# Patient Record
Sex: FEMALE | Race: WHITE | Employment: STUDENT | URBAN - METROPOLITAN AREA
[De-identification: names, ages, dates, MRNs, and addresses within clinical notes are randomized per-mention and may not be internally consistent; named-entity substitution may affect disease eponyms.]

---
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
## 2021-12-21 NOTE — PROGRESS NOTES
"  Assessment & Plan     Pain of right lower leg  They wanted ct or mri first however.  We discussed I can't order this as I would not know what to order and suspect it may not be covered by insurance. Prefer a specialist opinion. I can however get a knee xray to start there for her knee pain.  I suspect she will need physical therapy as well.           - Peds Orthopedics Referral; Future    Hip pain, right  Could be M/S or referred pain, same as above  - Peds Orthopedics Referral; Future    Chronic pain of right knee  Same as above referred to specialist  SHALA spencern  - Peds Orthopedics Referral; Future  - XR Knee Right 3 Views; Future    Also pain on left heel-? Corn, offerred podiatry but they declined for now. Did not have much time to talk about this today mentioned at end of visit.   Also mole left leg-they will monitor did not look concerning today.     Marely Burns PA-C  [facility]    Billin min spent on patient today including chart review, history, exam, and explaining treatment plan and follow-up.     Juan Jose Sosa is a 21 year old who presents for the following health issues  accompanied by her mother.    HPI     Mom is with today.     Per pt would like order for MRI or CT for her leg per [facility].     I have never seen patient for this before.  Mom did reading online and that is where this is coming from.  On her right side her foot has \"always turned inwards\". This affects her knee and hip they feel as she always has pain. Worse when on her feet for long periods or walking for long periods. Pain deep into the knee and right hip. No swelling. No fevers. No pain on left side.   Right thigh does not hurt there is no radiation from back into this. Complains of bilateral back pain.  knee pain for a few years ongoing but worse these past few months.  Used to go to chiropractor for back pain. Never did physical therapy. No imaging done. No treatment as a child " "was told she would outgrow it.     Is on feet a lot for her job. sometimes knee gives out per patient. No known injury.  Has not seen a physical therapy or ortho doctor for this  O Pain is daily.  Mom is wondering if her femur or other bones are \"twisted\".     Mom is wondering if it is a congenital issue with her bone. There is a doctor at [place] that deals with this.   Review of Systems   Constitutional, HEENT, cardiovascular, pulmonary, GI, , musculoskeletal, neuro, skin, endocrine and psych systems are negative, except as otherwise noted.      Objective    /68   Pulse 81   Temp 98.8  F (37.1  C) (Tympanic)   Resp 18   Wt 61.2 kg (135 lb)   SpO2 99%   BMI 23.17 kg/m    Body mass index is 23.17 kg/m .  Physical Exam   GENERAL: healthy, alert and no distress  NECK: no adenopathy, no asymmetry, masses, or scars and thyroid normal to palpation  RESP: lungs clear to auscultation - no rales, rhonchi or wheezes  CV: regular rate and rhythm, normal S1 S2, no S3 or S4, no murmur, click or rub, no peripheral edema and peripheral pulses strong  MS: no gross musculoskeletal defects noted, no edema  SKIN: no suspicious lesions or rashes  NEURO: Normal strength and tone, mentation intact and speech normal  PSYCH: mentation appears normal, affect normal/bright  Ortho: right Knee-no pain over patella, it does face more medially than left side. R foot is more inverted as well.  No erythema.  No effusion.  No ecchymosis. No warmth.  Tender to palpation --no location.   Range of motion intact fully.  Sensation intact distally.  Distal pulses strong. Hip, knee, and ankle strength 5/5 and equal bilaterally.  Anterior drawer sign negative. Valgus(MCL)/varus (LCL) testing negative for pain.  McMurrays negative.       Xray-pending          "

## 2022-01-06 ENCOUNTER — OFFICE VISIT (OUTPATIENT)
Dept: FAMILY MEDICINE | Facility: CLINIC | Age: 22
End: 2022-01-06
Payer: COMMERCIAL

## 2022-01-06 ENCOUNTER — ANCILLARY PROCEDURE (OUTPATIENT)
Dept: GENERAL RADIOLOGY | Facility: CLINIC | Age: 22
End: 2022-01-06
Attending: PHYSICIAN ASSISTANT
Payer: COMMERCIAL

## 2022-01-06 VITALS
OXYGEN SATURATION: 99 % | TEMPERATURE: 98.8 F | BODY MASS INDEX: 23.17 KG/M2 | SYSTOLIC BLOOD PRESSURE: 125 MMHG | HEART RATE: 81 BPM | RESPIRATION RATE: 18 BRPM | DIASTOLIC BLOOD PRESSURE: 68 MMHG | WEIGHT: 135 LBS

## 2022-01-06 DIAGNOSIS — M25.551 HIP PAIN, RIGHT: ICD-10-CM

## 2022-01-06 DIAGNOSIS — G89.29 CHRONIC PAIN OF RIGHT KNEE: ICD-10-CM

## 2022-01-06 DIAGNOSIS — M25.561 CHRONIC PAIN OF RIGHT KNEE: ICD-10-CM

## 2022-01-06 DIAGNOSIS — M79.661 PAIN OF RIGHT LOWER LEG: Primary | ICD-10-CM

## 2022-01-06 PROCEDURE — 99215 OFFICE O/P EST HI 40 MIN: CPT | Performed by: PHYSICIAN ASSISTANT

## 2022-01-06 PROCEDURE — 73562 X-RAY EXAM OF KNEE 3: CPT | Mod: RT | Performed by: RADIOLOGY

## 2022-01-06 NOTE — RESULT ENCOUNTER NOTE
PLEASE CALL PATIENT:  Dear Sheila,      It was a pleasure to see you at your recent office visit.  Your test results are listed below.  Normal xray.         If you have any questions or concerns, please call the clinic at [phone].    Sincerely,  Marely Burns PA-C

## 2022-01-06 NOTE — LETTER
January 7, 2022      Sheila Jiang  [address]  [place] [address]          Dear Sheila,       It was a pleasure to see you at your recent office visit.  Your test results are listed below.  Normal xray.         If you have any questions or concerns, please call the clinic at [phone].     Sincerely,   Marely Burns PA-C       Resulted Orders   XR Knee Right 3 Views    Narrative    RIGHT KNEE THREE VIEWS  1/6/2022 1:29 PM     HISTORY: Right knee pain.    COMPARISON: None available.      Impression    IMPRESSION:  Anatomic alignment right knee. No acute displaced right  knee fracture. No significant joint space narrowing. No right knee  joint effusion or anterior knee soft tissue swelling.     ALY JONES MD         SYSTEM ID:  [id number]

## 2022-01-07 ENCOUNTER — OFFICE VISIT (OUTPATIENT)
Dept: PODIATRY | Facility: CLINIC | Age: 22
End: 2022-01-07
Payer: COMMERCIAL

## 2022-01-07 ENCOUNTER — TELEPHONE (OUTPATIENT)
Dept: FAMILY MEDICINE | Facility: CLINIC | Age: 22
End: 2022-01-07

## 2022-01-07 VITALS
DIASTOLIC BLOOD PRESSURE: 76 MMHG | WEIGHT: 135 LBS | SYSTOLIC BLOOD PRESSURE: 128 MMHG | HEART RATE: 78 BPM | BODY MASS INDEX: 23.17 KG/M2

## 2022-01-07 DIAGNOSIS — S84.90XA NEUROPRAXIA OF LOWER EXTREMITY: ICD-10-CM

## 2022-01-07 DIAGNOSIS — R23.8 PIEZOGENIC PEDAL PAPULE: Primary | ICD-10-CM

## 2022-01-07 PROCEDURE — 99243 OFF/OP CNSLTJ NEW/EST LOW 30: CPT | Performed by: PODIATRIST

## 2022-01-07 NOTE — TELEPHONE ENCOUNTER
Marely Burns PA-C [facility]  PLEASE CALL PATIENT:   Dear Sheila,       It was a pleasure to see you at your recent office visit.  Your test results are listed below.  Normal xray.         If you have any questions or concerns, please call the clinic at [phone].     Sincerely,   Marely Burns PA-C      Tried calling patient, voicemail is full, I could not leave a message. Mailed letter.Melany Ortega MA/FRANCISCO

## 2022-01-07 NOTE — LETTER
1/7/2022         RE: Sheila Jiang  [address]   [place] [address]        Dear Colleague,    Thank you for referring your patient, Sheila Jiang, to the [facility]. Please see a copy of my visit note below.    Subjective:    Patient is seen today in consult from Marely Burns with a recent hx of right heel pain.  Points to the plantarmedial heel bump.    Aggravated by activity and relieved by rest.  No pain with barefoot.  No history of trauma.  Denies erythema ecchymosis or numbness.  She works at a job standing 40 hours/week at [employer].  Also has developed numbness on the central lateral ankle and somewhat distal and foot denies weakness.  Denies numbness anywhere else in foot.  She is often sitting on her foot.    ROS: See above       Allergies   Allergen Reactions     Seasonal Allergies Itching       Current Outpatient Medications   Medication Sig Dispense Refill     adapalene (DIFFERIN) 0.3 % external gel APPLY EXTERNALLY TO THE AFFECTED AREA AT BEDTIME 45 g 0       Patient Active Problem List   Diagnosis     Monocular exotropia     Vasovagal syncope       Past Medical History:   Diagnosis Date     Mood disorder (H)     prozac previouly     Strabismus        Past Surgical History:   Procedure Laterality Date     PE TUBES  2006     STICHES/SUTURES CARE  2006    cut by glass on leg      STRABISMUS SURGERY  02/04/2010    Bilateral lateral rectus recession of 5.0 mm.       No family history on file.    Social History     Tobacco Use     Smoking status: Never Smoker     Smokeless tobacco: Never Used   Substance Use Topics     Alcohol use: No         Objective:    Vitals: /76   Pulse 78   Wt 61.2 kg (135 lb)   BMI 23.17 kg/m    BMI: Body mass index is 23.17 kg/m .  Height: Data Unavailable    Constitutional/ general:  Pt is in no apparent distress, appears well-nourished.  Cooperative with history and physical exam.     Psych:  The patient answered questions  appropriately.  Normal affect.  Seems to have reasonable expectations, in terms of treatment.     Lungs:  Non labored breathing, non labored speech. No cough.  No audible wheezing. Even, quiet breathing.       Vascular:  Pedal pulses are palpable bilaterally for both the DP and PT arteries.  CFT < 3 sec.  No edema.  Pedal hair growth noted.     Neuro:  Alert and oriented x 3. Coordinated gait.  Light touch sensation is intact on all toes.  With palpating over intermediate dorsal cutaneous nerve anterior ankle there is numbness.  No numbness anywhere else dorsum of foot.    Derm: Normal texture and turgor.  No erythema, ecchymosis, or cyanosis.  No open lesions.     Musculoskeletal:    Lower extremity muscle strength is normal.  Patient is ambulatory without an assistive device or brace.  No gross deformities.   Normal arch with weightbearing.   ROM is within normal limits.  Right lower extremity has increased external tibial torsion.  Right heel plantar medial small mass.  Slight discomfort with palpation.  No erythema or ecchymosis.    Assessment:    Neuropraxia right intermediate dorsal cutaneous nerve  Piezogenic  papule    Plan: Discussed how repetitive pressure will cause the numbness over nerve dorsum of foot.  She will stop crossing her legs and sitting on her feet.  She will keep this offloaded at all times.  Discussed could take several months to heal.  Discussed cause of peizogenic papule.  We gave her a heel cup to cover heel.  She will wear this several months and allow this to heal.  RTC as needed.  Thank you for allowing me participate in the care of this patient.        Frederick Medina DPM, FACFAS          Again, thank you for allowing me to participate in the care of your patient.        Sincerely,        Frederick Medina DPM

## 2022-01-07 NOTE — PROGRESS NOTES
Subjective:    Patient is seen today in consult from Marely Burns with a recent hx of right heel pain.  Points to the plantarmedial heel bump.    Aggravated by activity and relieved by rest.  No pain with barefoot.  No history of trauma.  Denies erythema ecchymosis or numbness.  She works at a job standing 40 hours/week at [employer].  Also has developed numbness on the central lateral ankle and somewhat distal and foot denies weakness.  Denies numbness anywhere else in foot.  She is often sitting on her foot.    ROS: See above       Allergies   Allergen Reactions     Seasonal Allergies Itching       Current Outpatient Medications   Medication Sig Dispense Refill     adapalene (DIFFERIN) 0.3 % external gel APPLY EXTERNALLY TO THE AFFECTED AREA AT BEDTIME 45 g 0       Patient Active Problem List   Diagnosis     Monocular exotropia     Vasovagal syncope       Past Medical History:   Diagnosis Date     Mood disorder (H)     prozac previouly     Strabismus        Past Surgical History:   Procedure Laterality Date     PE TUBES  2006     STICHES/SUTURES CARE  2006    cut by glass on leg      STRABISMUS SURGERY  02/04/2010    Bilateral lateral rectus recession of 5.0 mm.       No family history on file.    Social History     Tobacco Use     Smoking status: Never Smoker     Smokeless tobacco: Never Used   Substance Use Topics     Alcohol use: No         Objective:    Vitals: /76   Pulse 78   Wt 61.2 kg (135 lb)   BMI 23.17 kg/m    BMI: Body mass index is 23.17 kg/m .  Height: Data Unavailable    Constitutional/ general:  Pt is in no apparent distress, appears well-nourished.  Cooperative with history and physical exam.     Psych:  The patient answered questions appropriately.  Normal affect.  Seems to have reasonable expectations, in terms of treatment.     Lungs:  Non labored breathing, non labored speech. No cough.  No audible wheezing. Even, quiet breathing.       Vascular:  Pedal pulses are palpable  bilaterally for both the DP and PT arteries.  CFT < 3 sec.  No edema.  Pedal hair growth noted.     Neuro:  Alert and oriented x 3. Coordinated gait.  Light touch sensation is intact on all toes.  With palpating over intermediate dorsal cutaneous nerve anterior ankle there is numbness.  No numbness anywhere else dorsum of foot.    Derm: Normal texture and turgor.  No erythema, ecchymosis, or cyanosis.  No open lesions.     Musculoskeletal:    Lower extremity muscle strength is normal.  Patient is ambulatory without an assistive device or brace.  No gross deformities.   Normal arch with weightbearing.   ROM is within normal limits.  Right lower extremity has increased external tibial torsion.  Right heel plantar medial small mass.  Slight discomfort with palpation.  No erythema or ecchymosis.    Assessment:    Neuropraxia right intermediate dorsal cutaneous nerve  Piezogenic  papule    Plan: Discussed how repetitive pressure will cause the numbness over nerve dorsum of foot.  She will stop crossing her legs and sitting on her feet.  She will keep this offloaded at all times.  Discussed could take several months to heal.  Discussed cause of peizogenic papule.  We gave her a heel cup to cover heel.  She will wear this several months and allow this to heal.  RTC as needed.  Thank you for allowing me participate in the care of this patient.        Frederick Medina, TEO, FACFAS

## 2022-01-07 NOTE — PATIENT INSTRUCTIONS
We wish you continued good healing. If you have any questions or concerns, please do not hesitate to contact us at  [phone]    [employer] (secure e-mail communication and access to your chart) to send a message or to make an appointment.    Please remember to call and schedule a follow up appointment if one was recommended at your earliest convenience.     PODIATRY CLINIC HOURS  TELEPHONE NUMBER    Dr. Frederick ROSAPKATERYNA [facility]        Clinics:  [place] Wright CMA   Tuesday 1PM-6PM  [place]  Wednesday 745AM-330PM  [place]  Thursday/Friday 745AM-230PM  [place] [facility]/[place] APPOINTMENTS  [phone]    [place] APPOINTMENTS  [phone]          If you need a medication refill, please contact us you may need lab work and/or a follow up visit prior to your refill (i.e. Antifungal medications).    If MRI needed please call Imaging at [phone] or [phone]    HOW DO I GET MY KNEE SCOOTER? Knee scooters can be picked up at ANY Medical Supply stores with your knee scooter Prescription.  OR    Bring your signed prescription to an [facility] showroom.

## 2022-01-16 ENCOUNTER — HEALTH MAINTENANCE LETTER (OUTPATIENT)
Age: 22
End: 2022-01-16

## 2022-01-17 NOTE — PROGRESS NOTES
"Sheila Jiang  :  2000  DOS: 2022  MRN: [id number]    Sports Medicine Clinic Visit    PCP: Marely Burns    Sheila Jiang is a 21 year old female who is seen in consultation at the request of  Marely Burns PA-C presenting with chronic right hip pain    Injury: Gradual onset of pain over the past. Patient notes that she has walked with a toe-in gait from a young age and that has not corrected itself. She notes deep knee and hip joint pain that does not connect that has increased over the past 2 years. Positive: numbness over her lateral ankle and had seen Dr. Medina regarding this.  Symptoms are better with heel cup.  Symptoms are worse with: toe in increases with walking. Pain worse with prolonged standing.  Other evaluation and/or treatments so far consists of: podiatry evaluation.  Recent imaging completed: X-rays completed: knee 2022.  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 Patient has a history of strabismis surgery and does not currently have her 's license due to lack of depth perception. Patient had been working on gaining depth perception in the past but had been unsuccessful in this attempt.    Social History: works in a restaurant.    Review of Systems  Musculoskeletal: as above  Remainder of review of systems is negative including constitutional, CV, pulmonary, GI, Skin and Neurologic except as noted in HPI or medical history.    Past Medical History:   Diagnosis Date     Mood disorder (H)     prozac previouly     Strabismus      Past Surgical History:   Procedure Laterality Date     PE TUBES  2006     STICHES/SUTURES CARE  2006    cut by glass on leg      STRABISMUS SURGERY  2010    Bilateral lateral rectus recession of 5.0 mm.     No family history on file.    Objective  BP (!) 88/61   Ht 1.626 m (5' 4\")   Wt 61.2 kg (135 lb)   BMI 23.17 kg/m        General: healthy, alert and in no distress      HEENT: no " scleral icterus or conjunctival erythema     Skin: no suspicious lesions or rash. No jaundice.     CV: regular rhythm by palpation, 2+ distal pulses, no pedal edema      Resp: normal respiratory effort without conversational dyspnea     Psych: normal mood and affect      Gait: nonantalgic, appropriate coordination and balance, significant intoeing    Neuro: normal light touch sensory exam of the extremities. Motor strength as noted below     Right Knee exam    ROM:        Full active and passive ROM with flexion and extension    Inspection:       no visible ecchymosis       no visible edema or effusion       Winking patellae       Signs of lower extremity internal rotation right greater than left    Skin:       no visible deformities       well perfused       capillary refill brisk    Patellar Motion:        Normal patellar tracking noted through range of motion    Non Tender:         medial patellar border        lateral patellar border        medial joint line        lateral joint line        along MCL        distal IT Band        infrapatellar tendon        tibial tubercle    Special Tests:        neg (-) Deann       neg (-) Lachman       neg (-) anterior drawer       neg (-) posterior drawer       neg (-) varus at 0 deg and 30 deg       neg (-) valgus at 0 deg and 30 deg    Evaluation of ipsilateral kinetic chain       normal strength with hip extension and abduction    Bilateral hip exam    Inspection:        no edema or ecchymosis in hip area       Signs of right greater than left femoral anteversion    ROM:       Full active and passive ROM     Strength:        flexion 5/5       extension 5/5       abduction 5/5    Tender:        greater trochanter mild on the right    Non Tender:        remainder of hip area       illiac crest       ASIS       pubis    Sensation:        grossly intact in hip and thigh    Skin:       well perfused       capillary refill brisk    Special Tests:        neg (-) ELISEO        Positive FADIR on the right       neg (-) scour      Radiology  Recent Results (from the past 744 hour(s))   XR Knee Right 3 Views    Narrative    RIGHT KNEE THREE VIEWS  1/6/2022 1:29 PM     HISTORY: Right knee pain.    COMPARISON: None available.      Impression    IMPRESSION:  Anatomic alignment right knee. No acute displaced right  knee fracture. No significant joint space narrowing. No right knee  joint effusion or anterior knee soft tissue swelling.     ALY JONES MD         SYSTEM ID:  [id number]       Assessment:  1. Femoral anteversion of both lower extremities    2. Pain of right lower leg    3. Hip pain, right    4. Chronic pain of right knee        Plan:  Discussed the assessment with the patient.  Follow up: As needed with me based on clinical progress and further evaluation work-up from Dr. Pritchard  Lifelong intoeing which is worse according to mom patient on the right side and now causing pain in the right hip and right knee  No significant concerns with knee exam, signs of hip joint irritability concerning for labral tear on right   Prior x-ray of the knee was reviewed in detail, no concerns reviewed the option for basic imaging of the hip today, deferred as we are going to refer her to Dr. Pritchard and standing alignment films may be more helpful and cannot be performed at this clinic  Signs of significant femoral anteversion on exam  PT approach reviewed and will certainly be part of her treatment, and may be the initial plan, referral available for me anytime  Referral placed today to see Dr. Bob Pritchard in [place]  Low impact activity strategies reviewed  Modified eliminate any painful weightbearing activity is much as possible  Gentle stretching encouraged  Also has a history of abnormal tracking in right eye, reviewed option for behavioral optometry consultation to review potential role and proprioception and movement planning on the right side  We discussed modified progressive  pain-free activity as tolerated  Home handouts provided and supportive care reviewed  All questions were answered today  Contact us with additional questions or concerns  Signs and sx of concern reviewed    Thanks very much for sending this nice lady to us, I will keep you updated with her progress      Nishant Dickens DO, [facility]  Sports Medicine Physician  [facility]      Time spent in chart review, one-on-one evaluation, discussion with patient regarding: nature of problem, clinical course, prior treatments, therapeutic options, shared-decision making, potential procedures and referrals, and charting related to the visit: 36 minutes.  If applicable, time does not include time spent performing any procedure.          Disclaimer: This note consists of symbols derived from keyboarding, dictation and/or voice recognition software. As a result, there may be errors in the script that have gone undetected. Please consider this when interpreting information found in this chart.

## 2022-01-19 ENCOUNTER — OFFICE VISIT (OUTPATIENT)
Dept: ORTHOPEDICS | Facility: CLINIC | Age: 22
End: 2022-01-19
Attending: PHYSICIAN ASSISTANT
Payer: COMMERCIAL

## 2022-01-19 VITALS
HEIGHT: 64 IN | SYSTOLIC BLOOD PRESSURE: 88 MMHG | DIASTOLIC BLOOD PRESSURE: 61 MMHG | WEIGHT: 135 LBS | BODY MASS INDEX: 23.05 KG/M2

## 2022-01-19 DIAGNOSIS — M79.661 PAIN OF RIGHT LOWER LEG: ICD-10-CM

## 2022-01-19 DIAGNOSIS — Q65.89 FEMORAL ANTEVERSION OF BOTH LOWER EXTREMITIES: Primary | ICD-10-CM

## 2022-01-19 DIAGNOSIS — M25.551 HIP PAIN, RIGHT: ICD-10-CM

## 2022-01-19 DIAGNOSIS — G89.29 CHRONIC PAIN OF RIGHT KNEE: ICD-10-CM

## 2022-01-19 DIAGNOSIS — M25.561 CHRONIC PAIN OF RIGHT KNEE: ICD-10-CM

## 2022-01-19 PROCEDURE — 99244 OFF/OP CNSLTJ NEW/EST MOD 40: CPT | Performed by: FAMILY MEDICINE

## 2022-01-19 RX ORDER — IBUPROFEN 200 MG
200 TABLET ORAL EVERY 4 HOURS PRN
COMMUNITY

## 2022-01-19 ASSESSMENT — PAIN SCALES - GENERAL: PAINLEVEL: MILD PAIN (3)

## 2022-01-19 ASSESSMENT — MIFFLIN-ST. JEOR: SCORE: 1367.36

## 2022-01-19 NOTE — LETTER
2022         RE: Sheila Jiang  [address]   [place] [address]        Dear Colleague,    Thank you for referring your patient, Sheila Jiang, to the [facility]. Please see a copy of my visit note below.    Sheila Jiang  :  2000  DOS: 2022  MRN: [id number]    Sports Medicine Clinic Visit    PCP: Marely Burns    Sheila Jiang is a 21 year old female who is seen in consultation at the request of  Marely Burns PA-C presenting with chronic right hip pain    Injury: Gradual onset of pain over the past. Patient notes that she has walked with a toe-in gait from a young age and that has not corrected itself. She notes deep knee and hip joint pain that does not connect that has increased over the past 2 years. Positive: numbness over her lateral ankle and had seen Dr. Medina regarding this.  Symptoms are better with heel cup.  Symptoms are worse with: toe in increases with walking. Pain worse with prolonged standing.  Other evaluation and/or treatments so far consists of: podiatry evaluation.  Recent imaging completed: X-rays completed: knee 2022.  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 Patient has a history of strabismis surgery and does not currently have her 's license due to lack of depth perception. Patient had been working on gaining depth perception in the past but had been unsuccessful in this attempt.    Social History: works in a restaurant.    Review of Systems  Musculoskeletal: as above  Remainder of review of systems is negative including constitutional, CV, pulmonary, GI, Skin and Neurologic except as noted in HPI or medical history.    Past Medical History:   Diagnosis Date     Mood disorder (H)     prozac previouly     Strabismus      Past Surgical History:   Procedure Laterality Date     PE TUBES  2006     STICHES/SUTURES CARE  2006    cut by glass on leg       "STRABISMUS SURGERY  02/04/2010    Bilateral lateral rectus recession of 5.0 mm.     No family history on file.    Objective  BP (!) 88/61   Ht 1.626 m (5' 4\")   Wt 61.2 kg (135 lb)   BMI 23.17 kg/m        General: healthy, alert and in no distress      HEENT: no scleral icterus or conjunctival erythema     Skin: no suspicious lesions or rash. No jaundice.     CV: regular rhythm by palpation, 2+ distal pulses, no pedal edema      Resp: normal respiratory effort without conversational dyspnea     Psych: normal mood and affect      Gait: nonantalgic, appropriate coordination and balance, significant intoeing    Neuro: normal light touch sensory exam of the extremities. Motor strength as noted below     Right Knee exam    ROM:        Full active and passive ROM with flexion and extension    Inspection:       no visible ecchymosis       no visible edema or effusion       Winking patellae       Signs of lower extremity internal rotation right greater than left    Skin:       no visible deformities       well perfused       capillary refill brisk    Patellar Motion:        Normal patellar tracking noted through range of motion    Non Tender:         medial patellar border        lateral patellar border        medial joint line        lateral joint line        along MCL        distal IT Band        infrapatellar tendon        tibial tubercle    Special Tests:        neg (-) Deann       neg (-) Lachman       neg (-) anterior drawer       neg (-) posterior drawer       neg (-) varus at 0 deg and 30 deg       neg (-) valgus at 0 deg and 30 deg    Evaluation of ipsilateral kinetic chain       normal strength with hip extension and abduction    Bilateral hip exam    Inspection:        no edema or ecchymosis in hip area       Signs of right greater than left femoral anteversion    ROM:       Full active and passive ROM     Strength:        flexion 5/5       extension 5/5       abduction 5/5    Tender:        greater " trochanter mild on the right    Non Tender:        remainder of hip area       illiac crest       ASIS       pubis    Sensation:        grossly intact in hip and thigh    Skin:       well perfused       capillary refill brisk    Special Tests:        neg (-) ELISEO       Positive FADIR on the right       neg (-) scour      Radiology  Recent Results (from the past 744 hour(s))   XR Knee Right 3 Views    Narrative    RIGHT KNEE THREE VIEWS  1/6/2022 1:29 PM     HISTORY: Right knee pain.    COMPARISON: None available.      Impression    IMPRESSION:  Anatomic alignment right knee. No acute displaced right  knee fracture. No significant joint space narrowing. No right knee  joint effusion or anterior knee soft tissue swelling.     ALY JONES MD         SYSTEM ID:  [id number]       Assessment:  1. Femoral anteversion of both lower extremities    2. Pain of right lower leg    3. Hip pain, right    4. Chronic pain of right knee        Plan:  Discussed the assessment with the patient.  Follow up: As needed with me based on clinical progress and further evaluation work-up from Dr. Pritchard  Lifelong intoeing which is worse according to mom patient on the right side and now causing pain in the right hip and right knee  No significant concerns with knee exam, signs of hip joint irritability concerning for labral tear on right   Prior x-ray of the knee was reviewed in detail, no concerns reviewed the option for basic imaging of the hip today, deferred as we are going to refer her to Dr. Pritchard and standing alignment films may be more helpful and cannot be performed at this clinic  Signs of significant femoral anteversion on exam  PT approach reviewed and will certainly be part of her treatment, and may be the initial plan, referral available for me anytime  Referral placed today to see Dr. Bob Pritchard in [place]  Low impact activity strategies reviewed  Modified eliminate any painful weightbearing activity is much as  possible  Gentle stretching encouraged  Also has a history of abnormal tracking in right eye, reviewed option for behavioral optometry consultation to review potential role and proprioception and movement planning on the right side  We discussed modified progressive pain-free activity as tolerated  Home handouts provided and supportive care reviewed  All questions were answered today  Contact us with additional questions or concerns  Signs and sx of concern reviewed    Thanks very much for sending this nice lady to us, I will keep you updated with her progress      Nishant Dickens DO, [place]  Sports Medicine Physician  [facility]      Time spent in chart review, one-on-one evaluation, discussion with patient regarding: nature of problem, clinical course, prior treatments, therapeutic options, shared-decision making, potential procedures and referrals, and charting related to the visit: 36 minutes.  If applicable, time does not include time spent performing any procedure.          Disclaimer: This note consists of symbols derived from keyboarding, dictation and/or voice recognition software. As a result, there may be errors in the script that have gone undetected. Please consider this when interpreting information found in this chart.      Again, thank you for allowing me to participate in the care of your patient.        Sincerely,        Nishant Dickens DO

## 2022-01-22 ENCOUNTER — MYC MEDICAL ADVICE (OUTPATIENT)
Dept: ORTHOPEDICS | Facility: CLINIC | Age: 22
End: 2022-01-22
Payer: COMMERCIAL

## 2022-02-11 ENCOUNTER — PRE VISIT (OUTPATIENT)
Dept: ORTHOPEDICS | Facility: CLINIC | Age: 22
End: 2022-02-11

## 2022-02-11 NOTE — TELEPHONE ENCOUNTER
Called and left message for patient, wanting to ask if patient was wanting her hips or knees evaluated. Both is fine, but we need new xrays of her hips.  Maryana Claros RN

## 2022-02-15 ENCOUNTER — ANCILLARY PROCEDURE (OUTPATIENT)
Dept: GENERAL RADIOLOGY | Facility: CLINIC | Age: 22
End: 2022-02-15
Attending: ORTHOPAEDIC SURGERY
Payer: COMMERCIAL

## 2022-02-15 ENCOUNTER — OFFICE VISIT (OUTPATIENT)
Dept: ORTHOPEDICS | Facility: CLINIC | Age: 22
End: 2022-02-15
Attending: FAMILY MEDICINE
Payer: COMMERCIAL

## 2022-02-15 VITALS — BODY MASS INDEX: 22.79 KG/M2 | HEIGHT: 65 IN | WEIGHT: 136.8 LBS

## 2022-02-15 DIAGNOSIS — M25.551 HIP PAIN, RIGHT: ICD-10-CM

## 2022-02-15 DIAGNOSIS — M79.661 PAIN OF RIGHT LOWER LEG: ICD-10-CM

## 2022-02-15 DIAGNOSIS — Q65.89 FEMORAL ANTEVERSION OF BOTH LOWER EXTREMITIES: ICD-10-CM

## 2022-02-15 DIAGNOSIS — M25.561 CHRONIC PAIN OF RIGHT KNEE: ICD-10-CM

## 2022-02-15 DIAGNOSIS — G89.29 CHRONIC PAIN OF RIGHT KNEE: ICD-10-CM

## 2022-02-15 PROCEDURE — 99203 OFFICE O/P NEW LOW 30 MIN: CPT | Performed by: ORTHOPAEDIC SURGERY

## 2022-02-15 PROCEDURE — 73523 X-RAY EXAM HIPS BI 5/> VIEWS: CPT | Performed by: RADIOLOGY

## 2022-02-15 ASSESSMENT — MIFFLIN-ST. JEOR: SCORE: 1393.89

## 2022-02-15 NOTE — PROGRESS NOTES
Assessment and Plan: This is a 21 year old with a chief complaint of intoeing gait, hip pain, and knee pain. On physical examination her foot progression angle appears neutral and her hip examination is benign with ROM symmetric and within expected range. They inquired about advanced imaging for femoral and tibial torsion. I counseled the patient that I'm not sure based on her examination today whether she would be perfectly appropriate for the referral but if she is interested in pursuing it I would refer her to Ajit Hebert MD at [facility] who has an interest in managing lower extremity torsion. She will follow-up with me PRN.     Chief Complaint: Consult (Hip and knee pain)    Physician:  Nishant Dickens    HPI: Sheila Jiang is a 21 year old female who presents today for evaluation of her right hip    Symptom Profile  Location of symptoms:  Groin and knee are about equal   Onset: insidious  Trend: getting no better or worse   Duration of symptoms:  Quality of symptoms: stiffness and clicking  Severity: severe  Alleviate:activity modification   Exacerbating: activities, walking, standing,   Previous Treatments: Previous treatments include activity modification, oral pain medication,     Current Status:  Results of the patient s Hip Disability and Osteoarthritis Outcome Score (HOOS)  are as follows (0-100 scales with 100 being the theoretical best):  Pain: 50  Symptoms: 45   ADLs: 96   Sports/Recreation: 75   Quality of Life:50  (http://koos.nu/)  [facility] Activity Score: 6    MEDICAL HISTORY:   Past Medical History:   Diagnosis Date     Mood disorder (H)     prozac previouly     Strabismus        Medications:     Current Outpatient Medications:      adapalene (DIFFERIN) 0.3 % external gel, APPLY EXTERNALLY TO THE AFFECTED AREA AT BEDTIME (Patient not taking: Reported on 1/19/2022), Disp: 45 g, Rfl: 0     ibuprofen (ADVIL/MOTRIN) 200 MG tablet, Take 200 mg by mouth every 4 hours as needed for mild pain, Disp: ,  "Rfl:     Allergies: Seasonal allergies    SURGICAL HISTORY:   Past Surgical History:   Procedure Laterality Date     PE TUBES  2006     STICHES/SUTURES CARE  2006    cut by glass on leg      STRABISMUS SURGERY  02/04/2010    Bilateral lateral rectus recession of 5.0 mm.       FAMILY HISTORY: No family history on file.    SOCIAL HISTORY:   Social History     Tobacco Use     Smoking status: Never Smoker     Smokeless tobacco: Never Used   Substance Use Topics     Alcohol use: No   Working, manager at a restaurant       REVIEW OF SYSTEMS:  The comprehensive review of systems from the intake form was reviewed with the patient.  No fever, weight change or fatigue. No dry eyes. No oral ulcers, sore throat or voice change. No palpitations, syncope, angina or edema.  No chest pain, excessive sleepiness, shortness of breath or hemoptysis.   No abdominal pain, nausea, vomiting, diarrhea or heartburn.  No skin rash. No focal weakness or numbness. No bleeding or lymphadenopathy. No rhinitis or hives.     Exam:  On physical examination the patient appears the stated age, is in no acute distress, affectThe is appropriate, and breathing is non-labored.  Vitals are documented in the EMR and have been reviewed:    Ht 1.655 m (5' 5.16\")   Wt 62.1 kg (136 lb 12.8 oz)   BMI 22.66 kg/m    5' 5.157\"  Body mass index is 22.66 kg/m .    Rises from chair: easily   Gait: no limp, foot progression angle appears neutral   Trendelenburg test:  Gains the exam table: easily     RIGHT and Left hip are symmetric   hip subjective: not irritated   Abd: 30  Add:20  PFC:  Flexion: 105  IRF: 20  ERF: 35  Impingement test: -  Prone anteversion testing is about 25 degrees    Distally, the circulatory, motor, and sensation exam is intact with 5/5 EHL, gastroc-soleus, and tibialis anterior.  Sensation to light touch is intact.  Dorsalis pedis and posterior tibialis pulses are palpable.  There are no sores on the feet, no bruising, and no " lymphedema.    X-rays:   Tonnis grade 0  LCE 25  Tonnis angle 1

## 2022-02-15 NOTE — NURSING NOTE
"Sheila Jiang's chief complaint for this visit includes:  Chief Complaint   Patient presents with     Consult     Hip and knee pain     PCP: Marely Burns    Referring Provider:  Nishant Dickens DO  [address] [place] [address]    Ht 1.655 m (5' 5.16\")   Wt 62.1 kg (136 lb 12.8 oz)   BMI 22.66 kg/m    Data Unavailable     Do you need any medication refills at today's visit? NO    Halina Lujan MA, ATC  "

## 2022-02-15 NOTE — LETTER
2/15/2022         RE: Sheila Jiang  [address]   [place] [address]        Dear Colleague,    Thank you for referring your patient, Sheila Jiang, to the [facility]. Please see a copy of my visit note below.    Assessment and Plan: This is a 21 year old with a chief complaint of intoeing gait, hip pain, and knee pain. On physical examination her foot progression angle appears neutral and her hip examination is benign with ROM symmetric and within expected range. They inquired about advanced imaging for femoral and tibial torsion. I counseled the patient that I'm not sure based on her examination today whether she would be perfectly appropriate for the referral but if she is interested in pursuing it I would refer her to Ajit Hebert MD at [facility] who has an interest in managing lower extremity torsion. She will follow-up with me PRN.     Chief Complaint: Consult (Hip and knee pain)    Physician:  Nishant Dickens    HPI: Sheila Jiang is a 21 year old female who presents today for evaluation of her right hip    Symptom Profile  Location of symptoms:  Groin and knee are about equal   Onset: insidious  Trend: getting no better or worse   Duration of symptoms:  Quality of symptoms: stiffness and clicking  Severity: severe  Alleviate:activity modification   Exacerbating: activities, walking, standing,   Previous Treatments: Previous treatments include activity modification, oral pain medication,     Current Status:  Results of the patient s Hip Disability and Osteoarthritis Outcome Score (HOOS)  are as follows (0-100 scales with 100 being the theoretical best):  Pain: 50  Symptoms: 45   ADLs: 96   Sports/Recreation: 75   Quality of Life:50  (http://koos.nu/)  [facility] Activity Score: 6    MEDICAL HISTORY:   Past Medical History:   Diagnosis Date     Mood disorder (H)     prozac previouly     Strabismus        Medications:     Current Outpatient Medications:      adapalene  "(DIFFERIN) 0.3 % external gel, APPLY EXTERNALLY TO THE AFFECTED AREA AT BEDTIME (Patient not taking: Reported on 1/19/2022), Disp: 45 g, Rfl: 0     ibuprofen (ADVIL/MOTRIN) 200 MG tablet, Take 200 mg by mouth every 4 hours as needed for mild pain, Disp: , Rfl:     Allergies: Seasonal allergies    SURGICAL HISTORY:   Past Surgical History:   Procedure Laterality Date     PE TUBES  2006     STICHES/SUTURES CARE  2006    cut by glass on leg      STRABISMUS SURGERY  02/04/2010    Bilateral lateral rectus recession of 5.0 mm.       FAMILY HISTORY: No family history on file.    SOCIAL HISTORY:   Social History     Tobacco Use     Smoking status: Never Smoker     Smokeless tobacco: Never Used   Substance Use Topics     Alcohol use: No   Working, manager at a restaurant       REVIEW OF SYSTEMS:  The comprehensive review of systems from the intake form was reviewed with the patient.  No fever, weight change or fatigue. No dry eyes. No oral ulcers, sore throat or voice change. No palpitations, syncope, angina or edema.  No chest pain, excessive sleepiness, shortness of breath or hemoptysis.   No abdominal pain, nausea, vomiting, diarrhea or heartburn.  No skin rash. No focal weakness or numbness. No bleeding or lymphadenopathy. No rhinitis or hives.     Exam:  On physical examination the patient appears the stated age, is in no acute distress, affectThe is appropriate, and breathing is non-labored.  Vitals are documented in the EMR and have been reviewed:    Ht 1.655 m (5' 5.16\")   Wt 62.1 kg (136 lb 12.8 oz)   BMI 22.66 kg/m    5' 5.157\"  Body mass index is 22.66 kg/m .    Rises from chair: easily   Gait: no limp, foot progression angle appears neutral   Trendelenburg test:  Gains the exam table: easily     RIGHT and Left hip are symmetric   hip subjective: not irritated   Abd: 30  Add:20  PFC:  Flexion: 105  IRF: 20  ERF: 35  Impingement test: -  Prone anteversion testing is about 25 degrees    Distally, the circulatory, " motor, and sensation exam is intact with 5/5 EHL, gastroc-soleus, and tibialis anterior.  Sensation to light touch is intact.  Dorsalis pedis and posterior tibialis pulses are palpable.  There are no sores on the feet, no bruising, and no lymphedema.    X-rays:   Tonnis grade 0  LCE 25  Tonnis angle 1            Again, thank you for allowing me to participate in the care of your patient.        Sincerely,        Bob Pritchard MD

## 2023-01-08 ENCOUNTER — HEALTH MAINTENANCE LETTER (OUTPATIENT)
Age: 23
End: 2023-01-08

## 2023-04-23 ENCOUNTER — HEALTH MAINTENANCE LETTER (OUTPATIENT)
Age: 23
End: 2023-04-23

## 2024-06-29 ENCOUNTER — HEALTH MAINTENANCE LETTER (OUTPATIENT)
Age: 24
End: 2024-06-29